# Patient Record
Sex: FEMALE | Race: WHITE | URBAN - METROPOLITAN AREA
[De-identification: names, ages, dates, MRNs, and addresses within clinical notes are randomized per-mention and may not be internally consistent; named-entity substitution may affect disease eponyms.]

---

## 2023-10-04 LAB
C. TRACHOMATIS, EXTERNAL RESULT: NEGATIVE
N. GONORRHOEAE, EXTERNAL RESULT: NEGATIVE

## 2023-10-18 LAB
ABO, EXTERNAL RESULT: NORMAL
HEP B, EXTERNAL RESULT: NEGATIVE
HEPATITIS C ANTIBODY, EXTERNAL RESULT: NEGATIVE
HIV, EXTERNAL RESULT: NEGATIVE
RH FACTOR, EXTERNAL RESULT: POSITIVE
RPR, EXTERNAL RESULT: NONREACTIVE
RUBELLA TITER, EXTERNAL RESULT: NORMAL

## 2024-04-29 LAB — GBS, EXTERNAL RESULT: NEGATIVE

## 2024-05-21 ENCOUNTER — HOSPITAL ENCOUNTER (INPATIENT)
Age: 24
LOS: 2 days | Discharge: HOME | End: 2024-05-23
Attending: OBSTETRICS & GYNECOLOGY | Admitting: OBSTETRICS & GYNECOLOGY
Payer: COMMERCIAL

## 2024-05-21 PROBLEM — Z37.9 NORMAL LABOR: Status: ACTIVE | Noted: 2024-05-21

## 2024-05-21 LAB
ABO + RH BLD: NORMAL
AMPHETAMINES UR QL SCN>1000 NG/ML: NORMAL
BARBITURATES UR QL SCN>200 NG/ML: NORMAL
BASOPHILS # BLD: 0.1 K/UL (ref 0–0.2)
BASOPHILS NFR BLD: 0.5 %
BENZODIAZ UR QL SCN>200 NG/ML: NORMAL
BLD GP AB SCN SERPL QL: NORMAL
BUPRENORPHINE+NOR UR QL SCN: NORMAL
CANNABINOIDS UR QL SCN>50 NG/ML: NORMAL
COCAINE UR QL SCN: NORMAL
DEPRECATED RDW RBC AUTO: 14.9 % (ref 12.4–15.4)
DRUG SCREEN COMMENT UR-IMP: NORMAL
EOSINOPHIL # BLD: 0.1 K/UL (ref 0–0.6)
EOSINOPHIL NFR BLD: 0.5 %
FENTANYL SCREEN, URINE: NORMAL
HCT VFR BLD AUTO: 36.7 % (ref 36–48)
HGB BLD-MCNC: 12.5 G/DL (ref 12–16)
LYMPHOCYTES # BLD: 1.3 K/UL (ref 1–5.1)
LYMPHOCYTES NFR BLD: 11.2 %
MCH RBC QN AUTO: 27.3 PG (ref 26–34)
MCHC RBC AUTO-ENTMCNC: 34.1 G/DL (ref 31–36)
MCV RBC AUTO: 80.2 FL (ref 80–100)
METHADONE UR QL SCN>300 NG/ML: NORMAL
MONOCYTES # BLD: 0.5 K/UL (ref 0–1.3)
MONOCYTES NFR BLD: 4.8 %
NEUTROPHILS # BLD: 9.3 K/UL (ref 1.7–7.7)
NEUTROPHILS NFR BLD: 83 %
OPIATES UR QL SCN>300 NG/ML: NORMAL
OXYCODONE UR QL SCN: NORMAL
PCP UR QL SCN>25 NG/ML: NORMAL
PH UR STRIP: 6 [PH]
PLATELET # BLD AUTO: 288 K/UL (ref 135–450)
PMV BLD AUTO: 8.7 FL (ref 5–10.5)
RBC # BLD AUTO: 4.57 M/UL (ref 4–5.2)
WBC # BLD AUTO: 11.3 K/UL (ref 4–11)

## 2024-05-21 PROCEDURE — 0KQM0ZZ REPAIR PERINEUM MUSCLE, OPEN APPROACH: ICD-10-PCS | Performed by: OBSTETRICS & GYNECOLOGY

## 2024-05-21 PROCEDURE — 80307 DRUG TEST PRSMV CHEM ANLYZR: CPT

## 2024-05-21 PROCEDURE — 86901 BLOOD TYPING SEROLOGIC RH(D): CPT

## 2024-05-21 PROCEDURE — 7200000001 HC VAGINAL DELIVERY

## 2024-05-21 PROCEDURE — 1220000000 HC SEMI PRIVATE OB R&B

## 2024-05-21 PROCEDURE — 86900 BLOOD TYPING SEROLOGIC ABO: CPT

## 2024-05-21 PROCEDURE — 86850 RBC ANTIBODY SCREEN: CPT

## 2024-05-21 PROCEDURE — 86780 TREPONEMA PALLIDUM: CPT

## 2024-05-21 PROCEDURE — 2580000003 HC RX 258: Performed by: OBSTETRICS & GYNECOLOGY

## 2024-05-21 PROCEDURE — 85025 COMPLETE CBC W/AUTO DIFF WBC: CPT

## 2024-05-21 RX ORDER — ACETAMINOPHEN 325 MG/1
650 TABLET ORAL EVERY 4 HOURS PRN
Status: DISCONTINUED | OUTPATIENT
Start: 2024-05-21 | End: 2024-05-21

## 2024-05-21 RX ORDER — KETOROLAC TROMETHAMINE 30 MG/ML
30 INJECTION, SOLUTION INTRAMUSCULAR; INTRAVENOUS EVERY 6 HOURS PRN
Status: DISCONTINUED | OUTPATIENT
Start: 2024-05-21 | End: 2024-05-22

## 2024-05-21 RX ORDER — SODIUM CHLORIDE 9 MG/ML
INJECTION, SOLUTION INTRAVENOUS PRN
Status: DISCONTINUED | OUTPATIENT
Start: 2024-05-21 | End: 2024-05-22

## 2024-05-21 RX ORDER — LANOLIN 100 %
OINTMENT (GRAM) TOPICAL PRN
Status: DISCONTINUED | OUTPATIENT
Start: 2024-05-21 | End: 2024-05-22

## 2024-05-21 RX ORDER — SODIUM CHLORIDE 0.9 % (FLUSH) 0.9 %
5-40 SYRINGE (ML) INJECTION PRN
Status: DISCONTINUED | OUTPATIENT
Start: 2024-05-21 | End: 2024-05-22

## 2024-05-21 RX ORDER — SODIUM CHLORIDE 0.9 % (FLUSH) 0.9 %
5-40 SYRINGE (ML) INJECTION EVERY 12 HOURS SCHEDULED
Status: DISCONTINUED | OUTPATIENT
Start: 2024-05-21 | End: 2024-05-21

## 2024-05-21 RX ORDER — CARBOPROST TROMETHAMINE 250 UG/ML
250 INJECTION, SOLUTION INTRAMUSCULAR PRN
Status: DISCONTINUED | OUTPATIENT
Start: 2024-05-21 | End: 2024-05-22

## 2024-05-21 RX ORDER — SODIUM CHLORIDE, SODIUM LACTATE, POTASSIUM CHLORIDE, AND CALCIUM CHLORIDE .6; .31; .03; .02 G/100ML; G/100ML; G/100ML; G/100ML
1000 INJECTION, SOLUTION INTRAVENOUS PRN
Status: DISCONTINUED | OUTPATIENT
Start: 2024-05-21 | End: 2024-05-21

## 2024-05-21 RX ORDER — METHYLERGONOVINE MALEATE 0.2 MG/ML
200 INJECTION INTRAVENOUS PRN
Status: DISCONTINUED | OUTPATIENT
Start: 2024-05-21 | End: 2024-05-22

## 2024-05-21 RX ORDER — SODIUM CHLORIDE, SODIUM LACTATE, POTASSIUM CHLORIDE, CALCIUM CHLORIDE 600; 310; 30; 20 MG/100ML; MG/100ML; MG/100ML; MG/100ML
INJECTION, SOLUTION INTRAVENOUS CONTINUOUS
Status: DISCONTINUED | OUTPATIENT
Start: 2024-05-21 | End: 2024-05-21

## 2024-05-21 RX ORDER — TERBUTALINE SULFATE 1 MG/ML
0.25 INJECTION, SOLUTION SUBCUTANEOUS
Status: DISCONTINUED | OUTPATIENT
Start: 2024-05-21 | End: 2024-05-21

## 2024-05-21 RX ORDER — DOCUSATE SODIUM 100 MG/1
100 CAPSULE, LIQUID FILLED ORAL 2 TIMES DAILY
Status: DISCONTINUED | OUTPATIENT
Start: 2024-05-21 | End: 2024-05-22

## 2024-05-21 RX ORDER — OMEPRAZOLE 10 MG/1
10 CAPSULE, DELAYED RELEASE ORAL DAILY
COMMUNITY

## 2024-05-21 RX ORDER — IBUPROFEN 400 MG/1
600 TABLET ORAL EVERY 8 HOURS PRN
Status: DISCONTINUED | OUTPATIENT
Start: 2024-05-21 | End: 2024-05-22

## 2024-05-21 RX ORDER — SODIUM CHLORIDE 0.9 % (FLUSH) 0.9 %
5-40 SYRINGE (ML) INJECTION PRN
Status: DISCONTINUED | OUTPATIENT
Start: 2024-05-21 | End: 2024-05-21

## 2024-05-21 RX ORDER — SODIUM CHLORIDE 9 MG/ML
25 INJECTION, SOLUTION INTRAVENOUS PRN
Status: DISCONTINUED | OUTPATIENT
Start: 2024-05-21 | End: 2024-05-21

## 2024-05-21 RX ORDER — ONDANSETRON 4 MG/1
4 TABLET, ORALLY DISINTEGRATING ORAL EVERY 6 HOURS PRN
Status: DISCONTINUED | OUTPATIENT
Start: 2024-05-21 | End: 2024-05-22

## 2024-05-21 RX ORDER — PNV NO.95/FERROUS FUM/FOLIC AC 28MG-0.8MG
1 TABLET ORAL
COMMUNITY
Start: 2024-02-23 | End: 2024-07-22

## 2024-05-21 RX ORDER — SODIUM CHLORIDE 0.9 % (FLUSH) 0.9 %
5-40 SYRINGE (ML) INJECTION EVERY 12 HOURS SCHEDULED
Status: DISCONTINUED | OUTPATIENT
Start: 2024-05-21 | End: 2024-05-22

## 2024-05-21 RX ORDER — ONDANSETRON 2 MG/ML
4 INJECTION INTRAMUSCULAR; INTRAVENOUS EVERY 6 HOURS PRN
Status: DISCONTINUED | OUTPATIENT
Start: 2024-05-21 | End: 2024-05-22

## 2024-05-21 RX ORDER — ACETAMINOPHEN 500 MG
1000 TABLET ORAL EVERY 8 HOURS SCHEDULED
Status: DISCONTINUED | OUTPATIENT
Start: 2024-05-21 | End: 2024-05-22

## 2024-05-21 RX ORDER — TRANEXAMIC ACID 10 MG/ML
1000 INJECTION, SOLUTION INTRAVENOUS
Status: DISCONTINUED | OUTPATIENT
Start: 2024-05-21 | End: 2024-05-22

## 2024-05-21 RX ORDER — MISOPROSTOL 100 UG/1
400 TABLET ORAL PRN
Status: DISCONTINUED | OUTPATIENT
Start: 2024-05-21 | End: 2024-05-22

## 2024-05-21 RX ORDER — SODIUM CHLORIDE, SODIUM LACTATE, POTASSIUM CHLORIDE, CALCIUM CHLORIDE 600; 310; 30; 20 MG/100ML; MG/100ML; MG/100ML; MG/100ML
INJECTION, SOLUTION INTRAVENOUS CONTINUOUS
Status: DISCONTINUED | OUTPATIENT
Start: 2024-05-21 | End: 2024-05-22

## 2024-05-21 RX ORDER — SODIUM CHLORIDE, SODIUM LACTATE, POTASSIUM CHLORIDE, AND CALCIUM CHLORIDE .6; .31; .03; .02 G/100ML; G/100ML; G/100ML; G/100ML
500 INJECTION, SOLUTION INTRAVENOUS PRN
Status: DISCONTINUED | OUTPATIENT
Start: 2024-05-21 | End: 2024-05-21

## 2024-05-21 RX ORDER — LIDOCAINE HYDROCHLORIDE 10 MG/ML
INJECTION, SOLUTION INFILTRATION; PERINEURAL
Status: DISCONTINUED
Start: 2024-05-21 | End: 2024-05-21

## 2024-05-21 RX ADMIN — SODIUM CHLORIDE, POTASSIUM CHLORIDE, SODIUM LACTATE AND CALCIUM CHLORIDE: 600; 310; 30; 20 INJECTION, SOLUTION INTRAVENOUS at 16:28

## 2024-05-21 RX ADMIN — Medication 10 ML: at 16:27

## 2024-05-21 NOTE — H&P
PLAN:    Labor: Admit, anticipate normal delivery, routine labor orders  Fetus: Reassuring  GBS: No  Other: continue expectant management  Reviewed birth plan with pt

## 2024-05-21 NOTE — PLAN OF CARE
Problem: Pain  Goal: Verbalizes/displays adequate comfort level or baseline comfort level  Outcome: Progressing     Problem: Infection - Adult  Goal: Absence of infection at discharge  Outcome: Progressing  Goal: Absence of infection during hospitalization  Outcome: Progressing  Goal: Absence of fever/infection during anticipated neutropenic period  Outcome: Progressing

## 2024-05-22 LAB
DEPRECATED RDW RBC AUTO: 15.2 % (ref 12.4–15.4)
HCT VFR BLD AUTO: 28.6 % (ref 36–48)
HGB BLD-MCNC: 9.7 G/DL (ref 12–16)
MCH RBC QN AUTO: 27.2 PG (ref 26–34)
MCHC RBC AUTO-ENTMCNC: 33.9 G/DL (ref 31–36)
MCV RBC AUTO: 80.4 FL (ref 80–100)
PLATELET # BLD AUTO: 249 K/UL (ref 135–450)
PMV BLD AUTO: 8 FL (ref 5–10.5)
RBC # BLD AUTO: 3.56 M/UL (ref 4–5.2)
REAGIN+T PALLIDUM IGG+IGM SERPL-IMP: NORMAL
WBC # BLD AUTO: 19.6 K/UL (ref 4–11)

## 2024-05-22 PROCEDURE — 85027 COMPLETE CBC AUTOMATED: CPT

## 2024-05-22 PROCEDURE — 6370000000 HC RX 637 (ALT 250 FOR IP): Performed by: OBSTETRICS & GYNECOLOGY

## 2024-05-22 PROCEDURE — 1220000000 HC SEMI PRIVATE OB R&B

## 2024-05-22 PROCEDURE — 36415 COLL VENOUS BLD VENIPUNCTURE: CPT

## 2024-05-22 PROCEDURE — 6370000000 HC RX 637 (ALT 250 FOR IP)

## 2024-05-22 RX ORDER — LANOLIN 100 %
OINTMENT (GRAM) TOPICAL PRN
Status: DISCONTINUED | OUTPATIENT
Start: 2024-05-22 | End: 2024-05-23 | Stop reason: HOSPADM

## 2024-05-22 RX ORDER — FERROUS SULFATE 325(65) MG
325 TABLET ORAL EVERY OTHER DAY
Status: COMPLETED | OUTPATIENT
Start: 2024-05-22 | End: 2024-05-22

## 2024-05-22 RX ORDER — ACETAMINOPHEN 500 MG
1000 TABLET ORAL EVERY 8 HOURS SCHEDULED
Status: DISCONTINUED | OUTPATIENT
Start: 2024-05-22 | End: 2024-05-23 | Stop reason: HOSPADM

## 2024-05-22 RX ORDER — ACETAMINOPHEN 500 MG
1000 TABLET ORAL EVERY 8 HOURS SCHEDULED
Status: DISCONTINUED | OUTPATIENT
Start: 2024-05-22 | End: 2024-05-22

## 2024-05-22 RX ORDER — SODIUM CHLORIDE 0.9 % (FLUSH) 0.9 %
5-40 SYRINGE (ML) INJECTION PRN
Status: DISCONTINUED | OUTPATIENT
Start: 2024-05-22 | End: 2024-05-23 | Stop reason: HOSPADM

## 2024-05-22 RX ORDER — IBUPROFEN 800 MG/1
800 TABLET ORAL EVERY 8 HOURS SCHEDULED
Status: DISCONTINUED | OUTPATIENT
Start: 2024-05-22 | End: 2024-05-23 | Stop reason: HOSPADM

## 2024-05-22 RX ORDER — SODIUM CHLORIDE 0.9 % (FLUSH) 0.9 %
5-40 SYRINGE (ML) INJECTION EVERY 12 HOURS SCHEDULED
Status: DISCONTINUED | OUTPATIENT
Start: 2024-05-22 | End: 2024-05-23 | Stop reason: HOSPADM

## 2024-05-22 RX ORDER — SODIUM CHLORIDE 9 MG/ML
INJECTION, SOLUTION INTRAVENOUS PRN
Status: DISCONTINUED | OUTPATIENT
Start: 2024-05-22 | End: 2024-05-23 | Stop reason: HOSPADM

## 2024-05-22 RX ORDER — DOCUSATE SODIUM 100 MG/1
100 CAPSULE, LIQUID FILLED ORAL 2 TIMES DAILY
Status: DISCONTINUED | OUTPATIENT
Start: 2024-05-22 | End: 2024-05-23 | Stop reason: HOSPADM

## 2024-05-22 RX ADMIN — DOCUSATE SODIUM 100 MG: 100 CAPSULE, LIQUID FILLED ORAL at 21:38

## 2024-05-22 RX ADMIN — ACETAMINOPHEN 1000 MG: 500 TABLET ORAL at 08:55

## 2024-05-22 RX ADMIN — IBUPROFEN 800 MG: 800 TABLET, FILM COATED ORAL at 21:38

## 2024-05-22 RX ADMIN — DOCUSATE SODIUM 100 MG: 100 CAPSULE, LIQUID FILLED ORAL at 08:57

## 2024-05-22 RX ADMIN — IBUPROFEN 800 MG: 800 TABLET, FILM COATED ORAL at 04:37

## 2024-05-22 RX ADMIN — IBUPROFEN 800 MG: 800 TABLET, FILM COATED ORAL at 13:02

## 2024-05-22 RX ADMIN — ACETAMINOPHEN 1000 MG: 500 TABLET ORAL at 00:47

## 2024-05-22 RX ADMIN — BENZOCAINE AND LEVOMENTHOL: 200; 5 SPRAY TOPICAL at 00:47

## 2024-05-22 RX ADMIN — WITCH HAZEL: 500 SOLUTION RECTAL; TOPICAL at 00:47

## 2024-05-22 RX ADMIN — ACETAMINOPHEN 1000 MG: 500 TABLET ORAL at 18:17

## 2024-05-22 RX ADMIN — FERROUS SULFATE TAB 325 MG (65 MG ELEMENTAL FE) 325 MG: 325 (65 FE) TAB at 08:57

## 2024-05-22 ASSESSMENT — PAIN SCALES - GENERAL
PAINLEVEL_OUTOF10: 5
PAINLEVEL_OUTOF10: 5
PAINLEVEL_OUTOF10: 3
PAINLEVEL_OUTOF10: 3
PAINLEVEL_OUTOF10: 5
PAINLEVEL_OUTOF10: 4

## 2024-05-22 ASSESSMENT — PAIN DESCRIPTION - DESCRIPTORS
DESCRIPTORS: ACHING;DISCOMFORT
DESCRIPTORS: SORE
DESCRIPTORS: DISCOMFORT
DESCRIPTORS: SORE

## 2024-05-22 ASSESSMENT — PAIN DESCRIPTION - LOCATION
LOCATION: PERINEUM
LOCATION: ABDOMEN
LOCATION: PERINEUM
LOCATION: PERINEUM

## 2024-05-22 ASSESSMENT — PAIN - FUNCTIONAL ASSESSMENT
PAIN_FUNCTIONAL_ASSESSMENT: ACTIVITIES ARE NOT PREVENTED

## 2024-05-22 ASSESSMENT — PAIN DESCRIPTION - ORIENTATION: ORIENTATION: LOWER

## 2024-05-22 NOTE — PLAN OF CARE
Problem: Pain  Goal: Verbalizes/displays adequate comfort level or baseline comfort level  2024 by Genoveva Herron RN  Outcome: Progressing  2024 1352 by Jasmina Hollis RN  Outcome: Progressing     Problem: Infection - Adult  Goal: Absence of infection at discharge  2024 by Genoveva Herron RN  Outcome: Progressing  2024 1352 by Jasmina Hollis RN  Outcome: Progressing  Goal: Absence of infection during hospitalization  2024 020 by Genoveva Herron RN  Outcome: Progressing  2024 1352 by Jasmina Hollis RN  Outcome: Progressing  Goal: Absence of fever/infection during anticipated neutropenic period  2024 by Genoveva Herron RN  Outcome: Progressing  2024 1352 by Jasmina Hollis RN  Outcome: Progressing     Problem: Safety - Adult  Goal: Free from fall injury  2024 by Genoveva Herron RN  Outcome: Progressing  2024 1352 by Jasmina Hollis RN  Outcome: Progressing     Problem: Discharge Planning  Goal: Discharge to home or other facility with appropriate resources  Outcome: Progressing     Problem: Postpartum  Goal: Experiences normal postpartum course  Description:  Postpartum OB-Pregnancy care plan goal which identifies if the mother is experiencing a normal postpartum course  Outcome: Progressing  Goal: Appropriate maternal -  bonding  Description:  Postpartum OB-Pregnancy care plan goal which identifies if the mother and  are bonding appropriately  Outcome: Progressing  Goal: Establishment of infant feeding pattern  Description:  Postpartum OB-Pregnancy care plan goal which identifies if the mother is establishing a feeding pattern with their   Outcome: Progressing  Goal: Incisions, wounds, or drain sites healing without S/S of infection  Outcome: Progressing

## 2024-05-22 NOTE — LACTATION NOTE
This note was copied from a baby's chart.  LACTATION CONSULTATION      Follow-up Consult: Reason for Follow-up Visitor called out requesting latch support. Baby bundled, asleep in crib.      Name: Samir Marks       MRN: [id number]               YOB: 2025   Time of Birth: 10:11 PM   Gestational age: Gestational Age: 39w2d   Birth Weight: Birth Weight: 3.345 kg (7 lb 6 oz) Most Recent Weight: Weight: 3.345 kg (7 lb 6 oz) (Filed from Delivery Summary)   Weight Change from Birth: 0%            Maternal Assessment:      Maternal Data:   Information for the patient's mother:  Arcadio Marks [[id number]]   25 y.o.    /Para:   Information for the patient's mother:  Arcadio Marks [[id number]]        Information for the patient's mother:  Arcadio Marks [[id number]]   39w2d          Breast Assessment  Right Breast: WDL  Right Nipple: Everts well   Right Areola: WDL   Right Nipple Comfort: pain scale: 0/10  Right Nipple Integrity: Intact     Infant Assessment:    DOL:17 hours of life      Feeding: Breastfeeding      I&O adequacy:  Urine output: is established  Stool output: is established  Percent weight change from birthweight: 0%        Intervention during consultation:     Interventions Performed:   Assisted with breastfeeding , Education , and Skin to skin     Latch & Positioning: Infant undressed, diaper checked but dry. Baby placed STS with mother in football position. Reviewed how to position baby well to the breast, offer breast support, and steps to obtain RUPERTO. Infant alert but without rooting or attempts to latch on. Mother attempted to hand express colostrum. Gave tips to improve technique for easier hand expression. Drops expressed and fed to infant. Infant remained disinterested with attempts to offer. Gave tips for waking infant with feedings as needed, and reassured sleepy behavior is common on the first DOL as baby recovers from birth. Explained how

## 2024-05-22 NOTE — LACTATION NOTE
This note was copied from a baby's chart.  Lactation Progress Note      Data:   Initial assessment of primip breastfeeder. Mother and primary RN states first 2 feeding went well. Last attempt, baby was sleepy and disinterested.     Action: Introduced self, number on whiteboard. Educated on feeding cues and encouraged ad betty feeds with cues. If infant is sleepy or disinterested, encouraged skin to skin and hand expression with offering of colostrum every 2-3 hours. Reviewed ways to know infant is getting enough including weight changes, output and satisfaction cues. Stressed importance of deep comfortable latch and ways to achieve this.    Questions answered regarding pumping in early weeks. Encouraged at breast feeds on demand and limiting pumping to once or twice a day. Gave examples of pumping an opposite breast and using slow flow nipples for offering bottles. Mother aware that unrestricted breastfeeding is best in establishing a good supply.    Response: Mother pleased with how feeding has been going. Encouraged to call as needed.

## 2024-05-22 NOTE — L&D DELIVERY SUMMARY NOTE
[address] [address]                         LABOR AND DELIVERY NOTE      PATIENT NAME: NIDIA VILLALBA         : 2000  MED REC NO: [id number]                      ROOM: 0381  ACCOUNT NO: [id number]                       ADMIT DATE: 2024  PROVIDER: Tha Coles MD      DATE OF PROCEDURE:  2024    PREOPERATIVE DIAGNOSES:    1. Term pregnancy at 39 weeks and 2 days.  2. Spontaneous labor.    POSTOPERATIVE DIAGNOSES:    1. Term pregnancy at 39 weeks and 2 days.  2. Spontaneous labor.    PROCEDURE:  Normal spontaneous vaginal delivery with second-degree laceration.    ANESTHESIA:  Local for repair and no epidural.    FINDINGS:  Male infant, Apgars of 9 and 9.  Weight unknown.    COMPLICATIONS:  None.    HISTORY:  The patient is a 24-year-old,  1, para 0, who ess admitted at 39 weeks and 2 days complaining of spontaneous labor.  She was found to be 2 cm and grossly ruptured on admission.  She did not request an epidural.  She did continue to make change to 6 cm and then finally to complete.  She pushed for approximately 3 hours before delivering the above infant. The infant was bulb suctioned and handed off to parents.   Examination of perineum revealed a second-degree laceration.  Lidocaine was instilled into the area prior to repairing the laceration.  Mother and infant were in stable condition in Recovery.          THA COLES MD      D:  2024 23:30:30     T:  2024 23:49:26     KEVIN/VARGHESE  Job #:  [id number]     Doc#:  [id number]

## 2024-05-22 NOTE — LACTATION NOTE
This note was copied from a baby's chart.  LACTATION CONSULTATION      Follow-up Consult: Reason for Follow-up: RN requests to assist with latching. Mother reports baby seems disinterested with this attempt and her last attempt to offer the breast. Has had several good feedings since birth.        Name: Samir Marks       MRN: [id number]               YOB: 2025   Time of Birth: 10:11 PM   Gestational age: Gestational Age: 39w2d   Birth Weight: Birth Weight: 3.345 kg (7 lb 6 oz) Most Recent Weight: Weight: 3.345 kg (7 lb 6 oz) (Filed from Delivery Summary)   Weight Change from Birth: 0%            Maternal Assessment:      Maternal Data:   Information for the patient's mother:  Arcadio Marks [[id number]]   25 y.o.    /Para:   Information for the patient's mother:  Arcadio Marks [[id number]]        Information for the patient's mother:  Arcadio Marks [[id number]]   39w2d          Breast Assessment  Right Breast: WDL  Right Nipple: Everts well   Right Areola: WDL   Right Nipple Comfort: pain scale: 0/10  comfortable with latching, nipple rounded with release from the breast  Right Nipple Integrity: Intact    Left Breast: breast not assessed this consult.     Infant Assessment:    DOL:11 hours of life      Feeding: Breastfeeding       I&O adequacy:  Urine output: is established  Stool output: is established  Percent weight change from birthweight: 0%      Intervention during consultation:     Interventions Performed:   Assisted with breastfeeding , Education , and Skin to skin     Latch & Positioning: Shown how to position baby well to the breast in belly to belly, nose to nipple positioning. Mother positioned baby well to the breast in cross cradle hold with LC coaching provided. Shown how to hand express colostrum. Infant rooting with wide open mouth, at first mother was bringing baby on with shallow latch at the base of her nipple with baby's lower lip with

## 2024-05-22 NOTE — PLAN OF CARE
Problem: Pain  Goal: Verbalizes/displays adequate comfort level or baseline comfort level  2024 0754 by Vesta Gomez RN  Outcome: Progressing  2024 0206 by Genoveva Herron RN  Outcome: Progressing     Problem: Infection - Adult  Goal: Absence of infection at discharge  2024 0754 by Vesta Gomez RN  Outcome: Progressing  2024 0206 by Genoveva Herron RN  Outcome: Progressing  Goal: Absence of infection during hospitalization  2024 0754 by Vesta Gomez RN  Outcome: Progressing  2024 0206 by Genoveva Herron RN  Outcome: Progressing  Goal: Absence of fever/infection during anticipated neutropenic period  2024 0754 by Vesta Gomez RN  Outcome: Progressing  2024 020 by Genoveva Herron RN  Outcome: Progressing     Problem: Safety - Adult  Goal: Free from fall injury  2024 0754 by Vesta Gomez RN  Outcome: Progressing  2024 0206 by Genoveva Herron RN  Outcome: Progressing     Problem: Discharge Planning  Goal: Discharge to home or other facility with appropriate resources  2024 0754 by Vesta Gomez RN  Outcome: Progressing  2024 020 by Genoveva Herron RN  Outcome: Progressing     Problem: Postpartum  Goal: Experiences normal postpartum course  Description:  Postpartum OB-Pregnancy care plan goal which identifies if the mother is experiencing a normal postpartum course  2024 0754 by Vesta Gomez RN  Outcome: Progressing  2024 0206 by Genoveva Herron RN  Outcome: Progressing  Goal: Appropriate maternal -  bonding  Description:  Postpartum OB-Pregnancy care plan goal which identifies if the mother and  are bonding appropriately  2024 0754 by Vesta Gomez RN  Outcome: Progressing  2024 020 by Genoveva Herron RN  Outcome: Progressing  Goal: Establishment of infant feeding pattern  Description:  Postpartum OB-Pregnancy care plan goal which identifies if the mother is establishing a

## 2024-05-22 NOTE — L&D DELIVERY SUMMARY NOTE
Department of Obstetrics and Gynecology  Spontaneous Vaginal Delivery Note    Labor & Delivery Summary  Labor Onset Date: 24  Labor Onset Time: 1731    Pre-operative Diagnosis:  Term pregnancy and Spontaneous labor    Post-operative Diagnosis:  Living  infant(s) and Male    Procedure:  Spontaneous vaginal delivery or Repair second degree spontaneous laceration    Surgeon:    Swapna    Information for the patient's :  Samir Marks [[id number]]     Male infant, APGAR's 9/9 weight unkown    Anesthesia:  none local for repair    Estimated blood loss:  550ml    Specimen:  Placenta not sent to pathology     Cord blood sent No    Complications:  none    Condition:  infant stable and mother stable    Details of Procedure:   The patient is a 24 y.o. female at 39w2d   OB History          1    Para        Term                AB        Living             SAB        IAB        Ectopic        Molar        Multiple        Live Births                 who was admitted for active phase labor. She received the following interventions: none She was known to be GBS negative and did not receive antibiotic prophylaxis. The patient progressed well,did not receive an epidural, became complete and started to push. After pushing for 3 hours the fetal head was at the perineum, nose and mouth suctioned with bulb suction and the rest of the infant delivered atraumatically, placed on mother abdomen.Cord was clamped and cut and infant handed off to the waiting nurse for evaluation. The delivery of the placenta was spontaneous. The perineum and vagina were explored and a second degree laceration was repaired in standard fashion.

## 2024-05-23 ENCOUNTER — LACTATION ENCOUNTER (OUTPATIENT)
Dept: INPATIENT UNIT | Age: 24
End: 2024-05-23

## 2024-05-23 VITALS
HEIGHT: 66 IN | RESPIRATION RATE: 18 BRPM | DIASTOLIC BLOOD PRESSURE: 74 MMHG | BODY MASS INDEX: 28.28 KG/M2 | HEART RATE: 90 BPM | OXYGEN SATURATION: 99 % | TEMPERATURE: 98 F | SYSTOLIC BLOOD PRESSURE: 102 MMHG | WEIGHT: 176 LBS

## 2024-05-23 PROCEDURE — 6370000000 HC RX 637 (ALT 250 FOR IP): Performed by: OBSTETRICS & GYNECOLOGY

## 2024-05-23 RX ORDER — IBUPROFEN 800 MG/1
800 TABLET ORAL EVERY 8 HOURS SCHEDULED
Qty: 60 TABLET | Refills: 0 | Status: SHIPPED | OUTPATIENT
Start: 2024-05-23

## 2024-05-23 RX ADMIN — IBUPROFEN 800 MG: 800 TABLET, FILM COATED ORAL at 13:52

## 2024-05-23 RX ADMIN — DOCUSATE SODIUM 100 MG: 100 CAPSULE, LIQUID FILLED ORAL at 09:48

## 2024-05-23 RX ADMIN — IBUPROFEN 800 MG: 800 TABLET, FILM COATED ORAL at 05:38

## 2024-05-23 RX ADMIN — ACETAMINOPHEN 1000 MG: 500 TABLET ORAL at 01:53

## 2024-05-23 RX ADMIN — ACETAMINOPHEN 1000 MG: 500 TABLET ORAL at 09:48

## 2024-05-23 ASSESSMENT — PAIN DESCRIPTION - LOCATION
LOCATION: ABDOMEN;GENERALIZED
LOCATION: ABDOMEN
LOCATION: ABDOMEN;GENERALIZED
LOCATION: ABDOMEN

## 2024-05-23 ASSESSMENT — PAIN - FUNCTIONAL ASSESSMENT
PAIN_FUNCTIONAL_ASSESSMENT: ACTIVITIES ARE NOT PREVENTED

## 2024-05-23 ASSESSMENT — PAIN DESCRIPTION - ORIENTATION
ORIENTATION: LOWER

## 2024-05-23 ASSESSMENT — PAIN SCALES - GENERAL
PAINLEVEL_OUTOF10: 6
PAINLEVEL_OUTOF10: 7
PAINLEVEL_OUTOF10: 5
PAINLEVEL_OUTOF10: 5

## 2024-05-23 ASSESSMENT — PAIN DESCRIPTION - DESCRIPTORS
DESCRIPTORS: SORE
DESCRIPTORS: SORE
DESCRIPTORS: ACHING;SORE
DESCRIPTORS: SORE

## 2024-05-23 NOTE — LACTATION NOTE
This note was copied from a baby's chart.  [facility] LACTATION CONSULTATION      Follow-up Lactation Consult for Mother with Infant in SCN        Name: Samir Marks       MRN: [id number]               YOB: 2025   Time of Birth: 10:11 PM   Gestational age: Gestational Age: 39w2d  Birth Weight: Birth Weight: 3.345 kg (7 lb 6 oz) Most Recent Weight: Weight: 3.345 kg (7 lb 6 oz) (Filed from Delivery Summary)   Weight Change from Birth: 0%         Intervention during consultation:     Interventions Performed:   Education  and observed infant latched to right breast in cross cradle hold.    Pump Arrangements:  Owns breast pump       Plan:  Breastfeed infant as often as tolerated/allowed in SCN.   Pump every 3 hours for 15 minutes. Use warm compress before pump sessions and during to promote milk flow if infant is receiving supplement or with poor feedings at breast  Use gentle massage during pump sessions to help stimulate flow.   Any amount of colostrum of breastmilk should go to the SCN.   Obtain additional supplies while in SCN or ask RN for assistance.  Reach out for lactation support with any questions, concerns or needs.      Mother's Response:   Verbalized understanding of education and instruction, Active in care, Demonstrates latch well , Bonding well , and Pleased

## 2024-05-23 NOTE — DISCHARGE INSTRUCTIONS
[employer] Screening  Controlling pain.    I have received the educational material listed above.  The  Channel has provided me with the opportunity to view instructional videos pertaining to infant care and the care of myself.    I verify that I have received the above information and have no further questions and feel confident to care for myself and my baby.    For more information about postpartum care, baby care and feeding, create a [facility] My Chart account, sign in and search using the magnifying glass, typing in postpartum, breast feeding or formula feeding.  [email or web address]

## 2024-05-23 NOTE — PROGRESS NOTES
[facility]  Labor and Delivery   Post Partum Progress Note      SUBJECTIVE:  PPD# 2 s/p  - male   Desires circ for male infant- reviewed procedure, consent obtained. Baby currently in SCN. Will await clearance from peds for circ  Pt feeling well. No complaints    OBJECTIVE:      Vitals:  Vitals:    24 0153   BP: 108/70   Pulse: 99   Resp: 16   Temp: 97.9 °F (36.6 °C)   SpO2: 98%        Fundus firm, normal lochia  Extremities normal      DATA:    CBC:    Lab Results   Component Value Date/Time    WBC 19.6 2024 06:25 AM    RBC 3.56 2024 06:25 AM    HGB 9.7 2024 06:25 AM    HCT 28.6 2024 06:25 AM    MCV 80.4 2024 06:25 AM    RDW 15.2 2024 06:25 AM     2024 06:25 AM       ASSESSMENT & PLAN:      PPD# 2 s/p  - male   Dc home today, fu in office 6 weeks for pp     Kiara Vee MD    
Dr. Barcenas at pt bedside. FHR tracing reviewed. Pt is ok to be intermittently monitored.  
[facility]  Labor and Delivery   Post Partum Progress Note      SUBJECTIVE:  PPD 1/2 d s/p  male fetus    OBJECTIVE:      Vitals:  Vitals:    24 0437   BP: 109/64   Pulse: (!) 118   Resp: 16   Temp: 99 °F (37.2 °C)   SpO2:         Fundus firm, normal lochia  Extremities normal      DATA:    CBC:    Lab Results   Component Value Date/Time    WBC 19.6 2024 06:25 AM    RBC 3.56 2024 06:25 AM    HGB 9.7 2024 06:25 AM    HCT 28.6 2024 06:25 AM    MCV 80.4 2024 06:25 AM    RDW 15.2 2024 06:25 AM     2024 06:25 AM       ASSESSMENT & PLAN:      Doing well PPD 1/2. Fe qod with anemia-asymptomatic  PP care     CARO Gallardo MD  
Mom discharged ,but staying in room so she can care for her infant. Infant remains in [facility].  Mom instructed to keep infant ID band on and phone number to [facility] provided to mom and encouraged to call.  Mom discharged in stable condition.   
Patient actively pushing.  RN remains in continuous attendance at the bedside.  Assessment & evaluation of fetal heart rate ongoing via continuous EFM.   
Pt feeling more constant pressure. SVE done pt is 9cm, 100% effaced and +1 station. Pt coping well.   
Pt presents to [facility] with SROM at 0800. Pt was seen in the office today by Dr. Stover and was confirmed SROM and 2 cm. Pt ambulated to room 381 for admission with family and belongings  
Pt put back on efm. Pt tolerating contractions well.   
Pt put on efm at this time. Pt states she has felt baby move today. Amniotic fluid is clear. Pt denies vaginal bleeding. Pt dnies headache, blurred vision, nausea or vomiting.   
RN remained at bedside throughout pushing.  EFM continuously assessed.  Vaginal delivery of viable infant.   
Report given to Genoveva PETERSEN  
page C5 in their discharge booklet they can take.  13. Do you have any other questions or concerns I can address today? Y/N  ______________      _________________________________________________________________________    Information provided during call :_________________________________________________  ___________________________________________________________________________    Call completed by:____________________________    Date:_________ Time:___________  Discharge Phone Call    Patient Name: Arcadio Marks     OB Care Provider: Tha Barcenas MD Discharge Date: 2024    Disposition of baby:    Phone Number: [phone] (home)     Attempts to Contact:  Date:    Caller  Date:    Caller  Date:    Caller    Information for the patient's :  Samir Marks [[id number]]   Delivery Method: Vaginal, Spontaneous     1.  Now that you are at home is your pain being well controlled?   Y/N   If no, instruct to call       provider.      2. Are you breastfeeding?    Y/N    Do you need any extra support from our lactation staff?      Y/N    If yes, provide number for lactation.  3. Have you made or already had your first appointment with the baby's doctor? Y/N   If no, do      you know when to schedule it?  Y/N    4. Have you scheduled your follow-up appointment?  Y/N  If no, do you know when to schedule       it?    Y/N   If no, they can find it on printed discharge instructions.  5. Did staff discuss safe sleep during your stay? Y/N   6. Did we explain things in a way you could understand?  Y/N  7. Were we respectful of your preferences for labor and birth and include you in the plan of       care?  Y/N  If no, please explain _______________________________________________  8. Is there anyone in particular you would like to mention who provided care for you? _______      _________________________________________________________________________     9. Were you given a Post-Birth Warning

## 2024-05-23 NOTE — PLAN OF CARE
Problem: Pain  Goal: Verbalizes/displays adequate comfort level or baseline comfort level  Outcome: Progressing     Problem: Infection - Adult  Goal: Absence of infection at discharge  Outcome: Progressing  Goal: Absence of infection during hospitalization  Outcome: Progressing  Goal: Absence of fever/infection during anticipated neutropenic period  Outcome: Progressing     Problem: Safety - Adult  Goal: Free from fall injury  Outcome: Progressing     Problem: Discharge Planning  Goal: Discharge to home or other facility with appropriate resources  Outcome: Progressing     Problem: Postpartum  Goal: Experiences normal postpartum course  Description:  Postpartum OB-Pregnancy care plan goal which identifies if the mother is experiencing a normal postpartum course  Outcome: Progressing  Goal: Appropriate maternal -  bonding  Description:  Postpartum OB-Pregnancy care plan goal which identifies if the mother and  are bonding appropriately  Outcome: Progressing  Goal: Establishment of infant feeding pattern  Description:  Postpartum OB-Pregnancy care plan goal which identifies if the mother is establishing a feeding pattern with their   Outcome: Progressing  Goal: Incisions, wounds, or drain sites healing without S/S of infection  Outcome: Progressing     Problem: Chronic Conditions and Co-morbidities  Goal: Patient's chronic conditions and co-morbidity symptoms are monitored and maintained or improved  Outcome: Progressing

## 2024-05-23 NOTE — LACTATION NOTE
This note was copied from a baby's chart.  LACTATION CONSULT FOR BREASTFEEDING IN SCN  Infant Assessment    DOL: 2 days Corrected Gestational Age: 39w 4d    Vital Signs:  Heart Rate Status: stable   O2 Status: Room Air   Respiration Status: stable       Maternal Assessment    Pumping: Will be setting up with breast pump    Breastfeeding Assessment: Assisted MOB with positioning infant to the right breast in a football hold. Reviewed position and hold. Reviewed nipple to nose, compression on the breast and bringing infant up and onto the breast. Several on and off latches before obtaining deep latches and maintaining. Infant remained at the breast actively feeding at time of LC exit. Encouraged to call for continued assist.     Education: Breastfeed infant as often as tolerated in SCN  and Importance of maternal comfort and infant alignment to the breast     Recommend: Breastfeed infant in SCN as ordered/tolerated  and Request lactation assistance with any questions, concerns or needs.

## 2024-05-23 NOTE — DISCHARGE SUMMARY
Obstetrical Discharge Form    Gestational Age:39w2d    Antepartum complications: none    Date of Delivery: 2024      Type of Delivery: vaginal, spontaneous    Delivered By:  Dr. Barcenas         Baby:   Information for the patient's :  Samir Marks [[id number]]      Anesthesia: None, local for repair    Intrapartum complications: None    Postpartum complications: none    Discharge Medication:      Medication List        START taking these medications      ibuprofen 800 MG tablet  Commonly known as: ADVIL;MOTRIN  Take 1 tablet by mouth every 8 hours            CONTINUE taking these medications      PRENATAL 1 PO            ASK your doctor about these medications      Iron 325 (65 Fe) MG Tabs     omeprazole 10 MG delayed release capsule  Commonly known as: PRILOSEC               Where to Get Your Medications        You can get these medications from any pharmacy    Bring a paper prescription for each of these medications  ibuprofen 800 MG tablet          Discharge Date: 24    Condition on discharge: Stable    Plan:   Follow up in 6 week(s)  Reviewed discharge instructions and scripts     Kiara Vee MD

## 2024-05-23 NOTE — LACTATION NOTE
This note was copied from a baby's chart.  LACTATION CONSULTATION      Follow-up Consult: Reason for Follow-up: education, latch assist in SCN      Name: Samir Marks       MRN: [id number]               YOB: 2025   Time of Birth: 10:11 PM   Gestational age: Gestational Age: 39w2d   Birth Weight: Birth Weight: 3.345 kg (7 lb 6 oz) Most Recent Weight: Weight: 3.345 kg (7 lb 6 oz) (Filed from Delivery Summary)   Weight Change from Birth: 0%            Maternal Assessment:      Maternal Data:   Information for the patient's mother:  Arcadio Marks [[id number]]   25 y.o.    /Para:   Information for the patient's mother:  Arcadio Marks [[id number]]        Information for the patient's mother:  Arcadio Marks [[id number]]   39w2d          Breast Assessment  Right Breast: WDL and Dense  Right Nipple: Everts well   Right Areola: WDL   Right Nipple Comfort: pain scale: 0/10  Right Nipple Integrity: Intact    Left Breast: WDL and Dense  Left Nipple: Everts well   Left Areola: WDL   Left Nipple Comfort: pain scale: 0/10  Left Nipple Integrity: Intact     Infant Assessment:    DOL:23 hours       Feeding: Breastfeeding     Nipple Shield in Use: No     I&O adequacy:  Urine output: is established  Stool output: is established  Percent weight change from birthweight: 0%     Oral Assessment:   Palate:intact    Frenulum:infant extends tongue with heart shape bifurcation at the end. Infant does not lift tongue well. Pulls tongue up and back when attempting to latch.        TABBY SCORE:________5______________        Scoring of TABBY tool  A score of:   8 indicates normal tongue function;   6 or 7 are considered as borderline: suggest a                              'wait and see' approach with support for  breastfeeding positioning & attachment;   5 or below suggests that there is impairment of  tongue function.       Birth Factors/Diagnosis that could create risk for

## 2024-05-23 NOTE — LACTATION NOTE
This note was copied from a baby's chart.  [facility] LACTATION CONSULTATION      Follow-up Lactation Consult for Mother with Infant in SCN       Name: Samir Marks       MRN: [id number]               YOB: 2025   Time of Birth: 10:11 PM   Gestational age: Gestational Age: 39w2d  Birth Weight: Birth Weight: 3.345 kg (7 lb 6 oz) Most Recent Weight: Weight: 3.345 kg (7 lb 6 oz) (Filed from Delivery Summary)   Weight Change from Birth: 0%        Intervention during consultation:     Interventions Performed:   Electric breast pump , Warm compress, Lanolin provided and instructed on use, and Education     Manual Expression:  MOB states she understands     Bedside Breast Pump:   Symphony , Set up and instructed , and Assisted with pump session     Breast Shield Size:   21mm, would benefit from smaller breast shield, ~15mm, Educated and instructed on how to determine correct shield sizing, and Verbalized understanding that shield size can change    Amount of milk expressed:   Pumping at time of LC exit     Pump Arrangements:  Owns breast pump    Plan:  Breastfeed infant as often as tolerated/allowed in SCN.   Pump every 3 hours for 15 minutes. Use warm compress before pump sessions and during to promote milk flow.  Use gentle massage during pump sessions to help stimulate flow.   Any amount of colostrum of breastmilk should go to the SCN.   Obtain additional supplies while in SCN or ask RN for assistance.  Reach out for lactation support with any questions, concerns or needs.      Mother's Response:   Verbalized understanding of education and instruction

## 2024-05-24 ENCOUNTER — LACTATION ENCOUNTER (OUTPATIENT)
Dept: NURSERY | Age: 24
End: 2024-05-24

## 2024-05-24 NOTE — LACTATION NOTE
to obtain RUPERTO to the shield with SRS for about 5 minutes, then fell asleep at the breast. Encouraged mother to pump after feedings as needed to have expressed breast milk readily available to provide as needed to entice infant to latch onto the breast if infant struggling, and explained how instant gratification of having milk ready may help infant to latch. Encouraged much STS contact, educating on benefits and encouraged to allow infant to go to the breast ad betty with hunger cues. Educated on what early cues look like vs late cues, and encouraged to try and offer the breast early and often with early hunger cues.    Manual Expression:  Drops obtained and MOB demonstrates well     Bedside Breast Pump:   Symphony     Amount of milk expressed:   Drops    Pump Arrangements:  Owns breast pump      Education:  Latch & positioning , Feeding frequency & feeding cues , Exclusive breastfeeding , Breastfeeding Booklet reviewed , No artificial nipples , Risks of formula feeding , Expected intake and output , Feeding and diaper log , Skin to skin , Nipple shields , Electric breast pump , Milk storage guidelines , Feeding plan , Hand expression , [facility] , and [facility]           Action/Plan:  Breastfeed on cue and at least 8 times/24 hours, unlimited timing. May not nurse this often in the first 24 hours. Wake baby and offer breastfeeding if it has been 3 hours since the beginning of last feeding. Place infant skin to skin if infant will not breastfeed at 3 hours.   Hand express prior to latch to shadi nipple and entice infant to the breast.   It is important to use gentle stimulation during feeding to promote active eating. Offer both breasts at every feeding. Burp infant in between sides. Alternate which breast is used first.   Offer STS often while awake. Mother holding infant skin to skin between feedings will promote milk supply and allow infant to rest more deeply.

## 2024-05-24 NOTE — LACTATION NOTE
This note was copied from a baby's chart.  [facility] LACTATION CONSULTATION      Follow-up Lactation Consult for Mother with Infant in SCN      Name: Samir Marks       MRN: [id number]               YOB: 2025   Time of Birth: 10:11 PM   Gestational age: Gestational Age: 39w2d  Birth Weight: Birth Weight: 3.345 kg (7 lb 6 oz) Most Recent Weight: Weight: 3.345 kg (7 lb 6 oz) (Filed from Delivery Summary)   Weight Change from Birth: 0%         Maternal Assessment:      Breast Assessment  Right Breast: WDL and Dense  Right Nipple: Everts well   Right Areola: WDL   Right Nipple Comfort: pain scale: 0/10  Right Nipple Integrity: sore     Left Breast: WDL and Dense  Left Nipple: Everts well   Left Areola: WDL   Left Nipple Comfort: pain scale: 0/10  Left Nipple Integrity: sore        Pumping Assessment   Pumping as needed for poor feeds. Collected minimal drops per MOB.        Intervention during consultation:     Interventions Performed:   Assisted with breastfeeding , Hand expression, SNS provided and instructed on use , and Education     MOB with infant positioned to the left breast in a cross cradle hold. Infant with on and off latch with suck bursts. Changed infant's position to cross cradle and football on the right breast. Infant again with on and off latch with suck bursts. Infant getting fussy very quickly when releases the breast. Infant given gloved finger to calm and then will transition back to the breast. After several minutes of attempting sustained feeding encouraged to hold infant and allow to calm.     Discussed with MOB and FOB the the use of donor breast milk to supplement infant's feeds while working to establish supply. MOB agreed and Dr. Yanez agreed and put in order.   NICOLA Arellano warmed 15ml of DBM. Attempted to give infant DBM by bottle with slow flow nipple and paced bottle feeding. Infant took about 5ml before becoming fussy like at the breast. Attempted to calm

## 2024-05-25 ENCOUNTER — LACTATION ENCOUNTER (OUTPATIENT)
Dept: NURSERY | Age: 24
End: 2024-05-25

## 2024-05-25 NOTE — LACTATION NOTE
This note was copied from a baby's chart.  LACTATION CONSULTATION      Follow-up Consult: Reason for Follow-up: assess needs        Name: Samir Marks       MRN: [id number]               YOB: 2025   Time of Birth: 10:11 PM   Gestational age: Gestational Age: 39w2d   Birth Weight: Birth Weight: 3.345 kg (7 lb 6 oz) Most Recent Weight: Weight: 3.058 kg (6 lb 11.9 oz)   Weight Change from Birth: -9%            Maternal Assessment:      Maternal Data:   Information for the patient's mother:  Arcadio Marks [[id number]]   25 y.o.    /Para:   Information for the patient's mother:  Arcadio Marks [[id number]]        Information for the patient's mother:  Arcadio Marks [[id number]]   39w2d          Breast Assessment  Right Breast: Breasts not assessed this encounter   Left Breast: Breasts not assessed this encounter     Infant Assessment:    DOL:4      Feeding: Breastfeeding , Breastfeeding with supplement , and Pumping      Use of Supplementation: Medical due to: Low Output   Type of Supplementation: Breastmilk , Donor Milk , and Similac Total Care 360   Method of Supplementation: Bottle  and Slow Flow Nipple      Nipple Shield in Use: Yes  Nipple Shield Size: Small 20mm , for some feeds if needed      I&O adequacy:  Urine output: is established  Stool output: is established  Percent weight change from birthweight: -9%    Intervention during consultation:     Interventions Performed:   Skin to skin     Latch & Positioning: MOB feeding infant to the right breast in a cross cradle hold upon LC entry to room. MOB reports infant doing well this feeding.     Manual Expression:  MOB states she understands     Education:  Latch & positioning , Feeding frequency & feeding cues , [facility] , and [facility]           Action/Plan:  Breastfeed on cue and at least 8 times/24 hours, unlimited timing. May not nurse this often in